# Patient Record
Sex: FEMALE | URBAN - METROPOLITAN AREA
[De-identification: names, ages, dates, MRNs, and addresses within clinical notes are randomized per-mention and may not be internally consistent; named-entity substitution may affect disease eponyms.]

---

## 2023-08-16 ENCOUNTER — OFFICE VISIT (OUTPATIENT)
Dept: URBAN - METROPOLITAN AREA CLINIC 98 | Facility: CLINIC | Age: 33
End: 2023-08-16
Payer: COMMERCIAL

## 2023-08-16 ENCOUNTER — WEB ENCOUNTER (OUTPATIENT)
Dept: URBAN - METROPOLITAN AREA CLINIC 98 | Facility: CLINIC | Age: 33
End: 2023-08-16

## 2023-08-16 ENCOUNTER — DASHBOARD ENCOUNTERS (OUTPATIENT)
Age: 33
End: 2023-08-16

## 2023-08-16 VITALS
WEIGHT: 119 LBS | TEMPERATURE: 97.9 F | SYSTOLIC BLOOD PRESSURE: 92 MMHG | HEART RATE: 82 BPM | BODY MASS INDEX: 27.54 KG/M2 | HEIGHT: 55 IN | DIASTOLIC BLOOD PRESSURE: 69 MMHG

## 2023-08-16 DIAGNOSIS — R10.11 RUQ ABDOMINAL PAIN: ICD-10-CM

## 2023-08-16 PROCEDURE — 99204 OFFICE O/P NEW MOD 45 MIN: CPT | Performed by: INTERNAL MEDICINE

## 2023-08-16 NOTE — HPI-TODAY'S VISIT:
RUQ pain  started 1-2 months ago now more frequnt, daily worse after eating some nausea, no vomiting pain also felt in the back healthy diet takes vitamins and supplements no fever, chills or jaundice

## 2023-08-21 ENCOUNTER — OFFICE VISIT (OUTPATIENT)
Dept: URBAN - METROPOLITAN AREA CLINIC 92 | Facility: CLINIC | Age: 33
End: 2023-08-21

## 2023-08-23 LAB
A/G RATIO: 2
ALBUMIN: 4.7
ALKALINE PHOSPHATASE: 54
ALT (SGPT): 18
AST (SGOT): 22
BILIRUBIN, TOTAL: 0.4
BUN/CREATININE RATIO: (no result)
BUN: 11
CALCIUM: 9.9
CARBON DIOXIDE, TOTAL: 26
CHLORIDE: 103
CREATININE: 0.76
EGFR: 107
GLOBULIN, TOTAL: 2.4
GLUCOSE: 86
H PYLORI BREATH TEST: NOT DETECTED
H. PYLORI BREATH TEST: NOT DETECTED
HEMATOCRIT: 40.4
HEMOGLOBIN: 13.9
INTERPRETATION: NOT DETECTED
LIPASE: 21
MCH: 30.9
MCHC: 34.4
MCV: 89.8
MPV: 11.2
PLATELET COUNT: 234
POTASSIUM: 4.3
PROTEIN, TOTAL: 7.1
RDW: 11.8
RED BLOOD CELL COUNT: 4.5
SODIUM: 139
WHITE BLOOD CELL COUNT: 6.5

## 2023-08-25 ENCOUNTER — OFFICE VISIT (OUTPATIENT)
Dept: URBAN - METROPOLITAN AREA CLINIC 91 | Facility: CLINIC | Age: 33
End: 2023-08-25
Payer: COMMERCIAL

## 2023-08-25 DIAGNOSIS — K82.8 GALLBLADDER SLUDGE: ICD-10-CM

## 2023-08-25 PROCEDURE — 76705 ECHO EXAM OF ABDOMEN: CPT | Performed by: INTERNAL MEDICINE

## 2023-08-31 ENCOUNTER — OFFICE VISIT (OUTPATIENT)
Dept: URBAN - METROPOLITAN AREA TELEHEALTH 2 | Facility: TELEHEALTH | Age: 33
End: 2023-08-31

## 2023-09-08 ENCOUNTER — TELEPHONE ENCOUNTER (OUTPATIENT)
Dept: URBAN - METROPOLITAN AREA CLINIC 98 | Facility: CLINIC | Age: 33
End: 2023-09-08

## 2023-09-14 ENCOUNTER — OFFICE VISIT (OUTPATIENT)
Dept: URBAN - METROPOLITAN AREA TELEHEALTH 2 | Facility: TELEHEALTH | Age: 33
End: 2023-09-14